# Patient Record
Sex: FEMALE | Race: WHITE | NOT HISPANIC OR LATINO
[De-identification: names, ages, dates, MRNs, and addresses within clinical notes are randomized per-mention and may not be internally consistent; named-entity substitution may affect disease eponyms.]

---

## 2024-01-02 PROBLEM — Z00.00 ENCOUNTER FOR PREVENTIVE HEALTH EXAMINATION: Status: ACTIVE | Noted: 2024-01-02

## 2024-01-03 ENCOUNTER — APPOINTMENT (OUTPATIENT)
Dept: GASTROENTEROLOGY | Facility: CLINIC | Age: 84
End: 2024-01-03
Payer: MEDICARE

## 2024-01-03 VITALS
OXYGEN SATURATION: 98 % | HEIGHT: 64 IN | WEIGHT: 145 LBS | DIASTOLIC BLOOD PRESSURE: 82 MMHG | SYSTOLIC BLOOD PRESSURE: 140 MMHG | BODY MASS INDEX: 24.75 KG/M2 | HEART RATE: 81 BPM

## 2024-01-03 DIAGNOSIS — K59.00 CONSTIPATION, UNSPECIFIED: ICD-10-CM

## 2024-01-03 PROCEDURE — 99204 OFFICE O/P NEW MOD 45 MIN: CPT

## 2024-01-03 RX ORDER — EZETIMIBE 10 MG/1
10 TABLET ORAL
Refills: 0 | Status: ACTIVE | COMMUNITY

## 2024-01-03 RX ORDER — AMLODIPINE BESYLATE AND BENAZEPRIL HYDROCHLORIDE 5; 10 MG/1; MG/1
5-10 CAPSULE ORAL
Refills: 0 | Status: ACTIVE | COMMUNITY

## 2024-01-03 RX ORDER — HYDROCHLOROTHIAZIDE 25 MG/1
25 TABLET ORAL
Refills: 0 | Status: ACTIVE | COMMUNITY

## 2024-01-03 RX ORDER — ROSUVASTATIN CALCIUM 10 MG/1
10 TABLET, FILM COATED ORAL
Refills: 0 | Status: ACTIVE | COMMUNITY

## 2024-01-19 ENCOUNTER — LABORATORY RESULT (OUTPATIENT)
Age: 84
End: 2024-01-19

## 2024-01-19 ENCOUNTER — RESULT REVIEW (OUTPATIENT)
Age: 84
End: 2024-01-19

## 2024-01-23 NOTE — ASSESSMENT
[FreeTextEntry1] : lower abdominal pain, chronic, intermittent, increasing frequency, not improved with bm. nonspecific, abdominal exam benign today   -labs -CT A/P no IVC due to patient allergy -colonoscopy recommended in light of patient's history of advanced polyp and last colonoscopy in 2018 - patient would like to defer scheduling colonoscopy until CT scan results are back.  has upcoming ankle surgery and will not be able to drive her after procedure for 3 weeks.  Risks (including but not limited to sedation risks, infection, bleeding, perforation, possibility of missed lesions), benefits and alternatives to procedure, including not doing the procedure, were discussed with patient.  Constipation-  - trial of Sunfiber, digestive advantage -MiraLAX PRN if no bm in 24 hours

## 2024-01-23 NOTE — ADDENDUM
[FreeTextEntry1] : 1/23/24- spoke with patient's  who left a message- reviewed results- He will have patient check UA and urine cx and send results   IMPRESSION:  1.  Possible cystitis, correlate with urinalysis.  2.  Secondary signs of chronic bladder outlet obstruction or sequela of chronic distention.

## 2024-01-23 NOTE — HISTORY OF PRESENT ILLNESS
[FreeTextEntry1] : 83 year yo F PMH htn, hld,  OA , ROMULO for uterine prolapse/ rectal tightening for FI  05/2022 (Dr. Lynn Eric), h/o colon polyps  presents for the evaluation of chronic abdominal pain  seen at the request of Dr. 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  CoQ 10 blood pressure meds cholesterol meds  years of lower abdominal pain, off/on, does not keep her up at night , dull mild pain . she has had it prior to her uterine prolapse surgery.  bm variable as she got older, constipated or loose stools today, she had a complete bm today, this is unusual for her.  abdomen aches after a bm , some mild nausea. just doesn't feel well.   if she has 1-2 days without a bm, takes MiraLAX with good effect.  1 coffee in the am , drinks water during the day. not on a high fiber diet.  1 glass wine every night.  last colonoscopy 11/17/2018- normal. not told to repeat.  she had large polyp removed at some point prior to this-Dr. Kelvin Moralez.   she has seen cardiologist- Dr. Moncho Tyson recent stress test normal.   reports labs in the past year normal.  she cannot recall prior abdominal imaging   daughter had malignant melanoma age 47 , now paralyzed, requires wheelchair.  no other cancer in the family father had colon polyps Soc: no tobacco    Family Hx: no significant GI family history including colon cancer, stomach cancer, IBD, celiac   ROS: constitutional: no weight loss, fevers ENT: no deafness Eyes: no blindness Neck: no lymphadenopathy Chest: no shortness of breath, no cough Cardiac: no chest pain, no palpitations Vascular: no leg swelling GI: as noted in hpi : no dysuria, dark urine Skin: no rashes, lesions, jaundice Heme: no bleeding Endocrine: no DM unless otherwise stated in HPI   Physical Exam: (VS noted below) General: alert, comfortable, in no acute distress Eyes: normal sclera, anicteric Neck: normal, supple, no neck mass Pulm: no respiratory distress, clear to auscultation bilaterally Heart: RRR, normal S1 S2 Abd: Soft, non-tender, non-distended, normal bowel sounds, no appreciable hepatosplenomegaly, no masses palpated Rectal: deferred Ext: warm and well perfused, no edema Skin: no rashes, no jaundice Neuro: alert, grossly nonfocal   Labs/imaging/prior endoscopic results were reviewed to the extent available and pertinent findings noted in HPI

## 2024-01-24 DIAGNOSIS — R10.30 LOWER ABDOMINAL PAIN, UNSPECIFIED: ICD-10-CM

## 2024-01-24 RX ORDER — SODIUM SULFATE, MAGNESIUM SULFATE, AND POTASSIUM CHLORIDE 17.75; 2.7; 2.25 G/1; G/1; G/1
1479-225-188 TABLET ORAL
Qty: 24 | Refills: 0 | Status: ACTIVE | COMMUNITY
Start: 2024-01-24 | End: 1900-01-01